# Patient Record
Sex: MALE | NOT HISPANIC OR LATINO | URBAN - METROPOLITAN AREA
[De-identification: names, ages, dates, MRNs, and addresses within clinical notes are randomized per-mention and may not be internally consistent; named-entity substitution may affect disease eponyms.]

---

## 2020-08-22 ENCOUNTER — EMERGENCY (EMERGENCY)
Facility: HOSPITAL | Age: 40
LOS: 1 days | Discharge: HOME | End: 2020-08-22
Admitting: EMERGENCY MEDICINE
Payer: SELF-PAY

## 2020-08-22 VITALS
OXYGEN SATURATION: 95 % | WEIGHT: 164.91 LBS | HEART RATE: 86 BPM | SYSTOLIC BLOOD PRESSURE: 125 MMHG | HEIGHT: 70 IN | RESPIRATION RATE: 18 BRPM | DIASTOLIC BLOOD PRESSURE: 85 MMHG | TEMPERATURE: 98 F

## 2020-08-22 DIAGNOSIS — Z53.21 PROCEDURE AND TREATMENT NOT CARRIED OUT DUE TO PATIENT LEAVING PRIOR TO BEING SEEN BY HEALTH CARE PROVIDER: ICD-10-CM

## 2020-08-22 PROCEDURE — L9991: CPT

## 2020-08-22 NOTE — ED ADULT TRIAGE NOTE - CHIEF COMPLAINT QUOTE
Pt walks in c/o bleeding from surgical site. Pt D/C from [facility] at 1800 tonight after receiving sx on L 4th finger. Bandages soaked in blood. Pt denies blood thinner use, lightheadedness, dizziness.

## 2025-07-16 NOTE — ED ADULT TRIAGE NOTE - NS ED NURSE BANDS TYPE
Operative Note      Patient: Nick Duncan  YOB: 1952  MRN: [id number]    Date of Procedure: 7/16/2025  Pre procedure diagnosis: headaches  Post procedure diagnosis: Same       Specimens:   9 mL clear CSF    Findings:  Opening pressure 12 cm H2O    Detailed Description of Procedure:   Lumbar puncture under fluoroscopy at L2-L3 with Opening pressure 12 cm H2O and collection of 9 mL clear CSF    Electronically signed by Yahaira Danielson PA-C on 7/16/2025 at 12:20 PM    
Name band;